# Patient Record
(demographics unavailable — no encounter records)

---

## 2025-03-07 NOTE — PHYSICAL EXAM
[Chaperone Present] : A chaperone was present in the examining room during all aspects of the physical examination [Appropriately responsive] : appropriately responsive [Alert] : alert [No Acute Distress] : no acute distress [Soft] : soft [Non-tender] : non-tender [Non-distended] : non-distended [Oriented x3] : oriented x3 [Examination Of The Breasts] : a normal appearance [No Masses] : no breast masses were palpable [Labia Majora] : normal [Labia Minora] : normal [Rectocele] : a rectocele [Normal] : normal [Uterine Adnexae] : normal [FreeTextEntry2] : Luz Marina [FreeTextEntry1] : rash in pannus - likely yeast  [FreeTextEntry4] : denies problems with BM

## 2025-03-07 NOTE — HISTORY OF PRESENT ILLNESS
[TextBox_4] : 64yo presents for annual exam breast bx 2023- fibroadenoma  no complaints   [Mammogramdate] : 2023 [PapSmeardate] : 2023 [BoneDensityDate] : awhile ago  [ColonoscopyDate] : utd